# Patient Record
Sex: FEMALE | Race: WHITE | NOT HISPANIC OR LATINO | Employment: PART TIME | URBAN - METROPOLITAN AREA
[De-identification: names, ages, dates, MRNs, and addresses within clinical notes are randomized per-mention and may not be internally consistent; named-entity substitution may affect disease eponyms.]

---

## 2020-08-18 ENCOUNTER — OFFICE VISIT (OUTPATIENT)
Dept: FAMILY MEDICINE CLINIC | Facility: CLINIC | Age: 30
End: 2020-08-18

## 2020-08-18 VITALS
TEMPERATURE: 99.1 F | HEIGHT: 67 IN | DIASTOLIC BLOOD PRESSURE: 74 MMHG | HEART RATE: 93 BPM | SYSTOLIC BLOOD PRESSURE: 126 MMHG | OXYGEN SATURATION: 99 % | BODY MASS INDEX: 22.54 KG/M2 | WEIGHT: 143.6 LBS

## 2020-08-18 DIAGNOSIS — F19.10 DRUG ABUSE, IV (HCC): ICD-10-CM

## 2020-08-18 DIAGNOSIS — N64.3 BILATERAL GALACTORRHEA: ICD-10-CM

## 2020-08-18 DIAGNOSIS — R07.9 CHEST PAIN IN ADULT: Primary | ICD-10-CM

## 2020-08-18 DIAGNOSIS — N64.52 NIPPLE DISCHARGE IN FEMALE: ICD-10-CM

## 2020-08-18 PROCEDURE — 99204 OFFICE O/P NEW MOD 45 MIN: CPT | Performed by: FAMILY MEDICINE

## 2020-08-18 RX ORDER — MIRTAZAPINE 15 MG/1
15 TABLET, FILM COATED ORAL NIGHTLY
COMMUNITY
End: 2022-10-24

## 2020-08-18 NOTE — PROGRESS NOTES
Subjective   Samara Pearl is a 29 y.o. female.     Pt is here to [facility].   Pt under the care of [facility].  Took xanax about 3 weeks ago.    Chest pain over last couple months when lying down. If she sets up it mostly goes away.     History of Present Illness she had an appt with cardiology 2/2020 and 2/6 and cancelled and no showed 2/12/20. She reports some chest pain for last 2-3 months when she is laying flat and it goes away when she sits up.      She is seeing psych at [facility] for 1 month.   She is taking soboxone and has been off heroin and meth for 1 month.  She was in rehab in DEC in [facility] [place] at [facility].      She has history of ear tubes and tonsillectomy and rhinoplasty. She reports she is having some discharge like clostrum from breasts 3 weeks.  Last preg was 4 years ago.  She did breast feed for 9 weeks.      She also reports her father had MI at 48.      First day of LMP 7/12/20.  She has not taken a preg test.  She has been having the dc for 3 weeks.  She only taken remeron 1 time last night and it does coorelate with when she started suboxone.      She had covid test neg yesterday.       The following portions of the patient's history were reviewed and updated as appropriate: allergies, current medications, past family history, past medical history, past social history, past surgical history and problem list.    Review of Systems   Constitutional: Negative.    HENT: Negative.    Eyes: Negative.    Respiratory: Negative.    Cardiovascular: Negative.    Gastrointestinal: Negative.    Endocrine: Negative.    Genitourinary: Negative.    Musculoskeletal: Negative.    Skin: Negative.    Allergic/Immunologic: Negative.    Neurological: Negative.    Hematological: Negative.    Psychiatric/Behavioral: Negative.    All other systems reviewed and are negative.      Objective     Vitals:    08/18/20 1407   BP: 126/74   Pulse: 93   Temp:  "99.1 °F (37.3 °C)   SpO2: 99%   Weight: 65.1 kg (143 lb 9.6 oz)   Height: 170.2 cm (67\")       Physical Exam   Constitutional: She is oriented to person, place, and time. She appears well-developed and well-nourished.   HENT:   Head: Normocephalic and atraumatic.   Eyes: Pupils are equal, round, and reactive to light. EOM are normal. Right eye exhibits no discharge. Left eye exhibits no discharge.   Neck: Normal range of motion. Neck supple.   Cardiovascular: Normal rate, regular rhythm, normal heart sounds and intact distal pulses.   Pulmonary/Chest: Effort normal and breath sounds normal.   Abdominal: Soft. Bowel sounds are normal. She exhibits no mass. There is no tenderness.   Musculoskeletal: Normal range of motion.        Right shoulder: She exhibits no swelling.   Neurological: She is alert and oriented to person, place, and time. She has normal reflexes.   Skin: Skin is warm and dry. No cyanosis. Nails show no clubbing.   Psychiatric: She has a normal mood and affect. Her behavior is normal. Judgment and thought content normal.   Nursing note and vitals reviewed.      Assessment/Plan     Problem List Items Addressed This Visit        Nervous and Auditory    Chest pain in adult - Primary    Relevant Orders    Ambulatory Referral to Cardiology    CBC & Differential    TSH    Comprehensive Metabolic Panel    Hemoglobin A1c    Lipid Panel    Vitamin D 25 Hydroxy    Vitamin B12       Other    Drug abuse, IV (CMS/HCC)    Relevant Orders    Hepatitis Panel, Acute    RPR    HIV-1 / O / 2 Ag / Antibody 4th Generation    Nipple discharge in female    Relevant Orders    Prolactin    Bilateral galactorrhea        upt neg today  I suspect the nipple discharge is possibly from the Suboxone but I have asked her to call the Suboxone clinic to see if that is a side effect of the meantime she is going to return fasting tomorrow for a fasting cholesterol will check a prolactin level.  If his symptoms continue we will get a " mammogram possible breast ultrasound    It looks like she has already had a referral to see cardiology but she reports that her insurance had lapsed so she was unable to be seen so she is asking to be seen by cardiology.  Her reason and she occasionally has chest pain that feels like anxiety but also she has a close family history of coronary artery disease in her father at 49.  She just wants to establish with a cardiologist to make sure she is not currently having chest pain.

## 2020-08-31 ENCOUNTER — LAB (OUTPATIENT)
Dept: FAMILY MEDICINE CLINIC | Facility: CLINIC | Age: 30
End: 2020-08-31

## 2021-07-07 ENCOUNTER — TELEPHONE (OUTPATIENT)
Dept: FAMILY MEDICINE CLINIC | Facility: CLINIC | Age: 31
End: 2021-07-07

## 2021-07-07 NOTE — TELEPHONE ENCOUNTER
Attempted to contact patient via phone to discuss no-show policy.  No answer, letter mailed to home address on chart.

## 2021-11-09 ENCOUNTER — OFFICE VISIT (OUTPATIENT)
Dept: FAMILY MEDICINE CLINIC | Facility: CLINIC | Age: 31
End: 2021-11-09

## 2021-11-09 VITALS
WEIGHT: 154 LBS | SYSTOLIC BLOOD PRESSURE: 120 MMHG | RESPIRATION RATE: 18 BRPM | OXYGEN SATURATION: 99 % | HEIGHT: 67 IN | HEART RATE: 117 BPM | TEMPERATURE: 98.6 F | DIASTOLIC BLOOD PRESSURE: 80 MMHG | BODY MASS INDEX: 24.17 KG/M2

## 2021-11-09 DIAGNOSIS — R10.9 FLANK PAIN: ICD-10-CM

## 2021-11-09 DIAGNOSIS — N30.00 ACUTE CYSTITIS WITHOUT HEMATURIA: Primary | ICD-10-CM

## 2021-11-09 LAB
BILIRUB BLD-MCNC: NEGATIVE MG/DL
CLARITY, POC: ABNORMAL
COLOR UR: ABNORMAL
EXPIRATION DATE: ABNORMAL
GLUCOSE UR STRIP-MCNC: NEGATIVE MG/DL
KETONES UR QL: NEGATIVE
LEUKOCYTE EST, POC: ABNORMAL
Lab: ABNORMAL
NITRITE UR-MCNC: POSITIVE MG/ML
PH UR: 6 [PH] (ref 5–8)
PROT UR STRIP-MCNC: NEGATIVE MG/DL
RBC # UR STRIP: NEGATIVE /UL
SP GR UR: 1.02 (ref 1–1.03)
UROBILINOGEN UR QL: NORMAL

## 2021-11-09 PROCEDURE — 87077 CULTURE AEROBIC IDENTIFY: CPT | Performed by: NURSE PRACTITIONER

## 2021-11-09 PROCEDURE — 87186 SC STD MICRODIL/AGAR DIL: CPT | Performed by: NURSE PRACTITIONER

## 2021-11-09 PROCEDURE — 87086 URINE CULTURE/COLONY COUNT: CPT | Performed by: NURSE PRACTITIONER

## 2021-11-09 PROCEDURE — 99213 OFFICE O/P EST LOW 20 MIN: CPT | Performed by: NURSE PRACTITIONER

## 2021-11-09 RX ORDER — IBUPROFEN 800 MG/1
800 TABLET ORAL EVERY 8 HOURS PRN
Qty: 90 TABLET | Refills: 0 | Status: SHIPPED | OUTPATIENT
Start: 2021-11-09

## 2021-11-09 RX ORDER — OXCARBAZEPINE 300 MG/1
TABLET, FILM COATED ORAL
COMMUNITY
Start: 2021-11-04 | End: 2022-10-24

## 2021-11-09 RX ORDER — CIPROFLOXACIN 500 MG/1
500 TABLET, FILM COATED ORAL 2 TIMES DAILY
Qty: 14 TABLET | Refills: 0 | Status: SHIPPED | OUTPATIENT
Start: 2021-11-09 | End: 2021-11-16

## 2021-11-09 RX ORDER — LAMOTRIGINE 100 MG/1
TABLET ORAL
COMMUNITY
Start: 2021-11-02 | End: 2022-10-24

## 2021-11-09 RX ORDER — BUPRENORPHINE HYDROCHLORIDE AND NALOXONE HYDROCHLORIDE DIHYDRATE 8; 2 MG/1; MG/1
TABLET SUBLINGUAL
COMMUNITY
Start: 2021-11-02 | End: 2022-10-24

## 2021-11-12 ENCOUNTER — TELEPHONE (OUTPATIENT)
Dept: FAMILY MEDICINE CLINIC | Facility: CLINIC | Age: 31
End: 2021-11-12

## 2021-11-12 LAB — BACTERIA SPEC AEROBE CULT: ABNORMAL

## 2021-11-12 NOTE — TELEPHONE ENCOUNTER
Patient returned call and RN reiterated Dr Clark's message. Patient has more questions and wants Dr Clark to order blood work. RN recommend making an appt to discuss this and recent changes.   Appt made for 9/7/2018   Pt notified

## 2021-11-12 NOTE — TELEPHONE ENCOUNTER
----- Message from WOODROW Quijano sent at 11/12/2021  8:47 AM EST -----  Urine culture positive for E.Coli and current antibiotic is acceptable to treat this. If patient has any persistent symptoms please notify me and may repeat urinalysis in 2 weeks.

## 2021-11-15 NOTE — PROGRESS NOTES
"Chief Complaint  Flank Pain and Urinary Tract Infection    Subjective          Samara Pearl presents to [facility]  Flank Pain  This is a new problem. The current episode started in the past 7 days. The problem occurs daily. The problem has been gradually worsening since onset. The pain is present in the lumbar spine. The quality of the pain is described as aching. The pain does not radiate. The pain is at a severity of 6/10. The pain is moderate. The pain is the same all the time. The symptoms are aggravated by position and standing. Stiffness is present all day. Associated symptoms include dysuria and pelvic pain. Pertinent negatives include no abdominal pain, bladder incontinence, bowel incontinence, paresis or paresthesias. She has tried bed rest and heat for the symptoms. The treatment provided mild relief.   Urinary Tract Infection   Associated symptoms include flank pain.       Objective   Vital Signs:   /80   Pulse 117   Temp 98.6 °F (37 °C)   Resp 18   Ht 170.2 cm (67\")   Wt 69.9 kg (154 lb)   SpO2 99%   BMI 24.12 kg/m²     Physical Exam  Vitals and nursing note reviewed.   Constitutional:       General: She is not in acute distress.     Appearance: Normal appearance.   HENT:      Head: Normocephalic.      Right Ear: External ear normal.      Left Ear: External ear normal.      Nose: Nose normal.   Eyes:      Extraocular Movements: Extraocular movements intact.      Conjunctiva/sclera: Conjunctivae normal.      Pupils: Pupils are equal, round, and reactive to light.   Cardiovascular:      Rate and Rhythm: Normal rate and regular rhythm.      Heart sounds: Normal heart sounds.   Pulmonary:      Effort: Pulmonary effort is normal.      Breath sounds: Normal breath sounds.   Abdominal:      General: There is no distension.      Palpations: There is no shifting dullness, fluid wave or mass.      Tenderness: There is abdominal tenderness in the suprapubic area. " There is no guarding.   Musculoskeletal:      Lumbar back: No swelling, edema, signs of trauma or tenderness.      Right lower leg: No edema.      Left lower leg: No edema.   Skin:     General: Skin is warm and dry.   Neurological:      Mental Status: She is alert and oriented to person, place, and time.   Psychiatric:         Mood and Affect: Mood normal.         Behavior: Behavior normal.         Thought Content: Thought content normal.         Judgment: Judgment normal.        Result Review :     UA    Urinalysis 11/9/21   Ketones, UA Negative   Leukocytes, UA Trace (A)   (A) Abnormal value                     Assessment and Plan    Diagnoses and all orders for this visit:    1. Acute cystitis without hematuria (Primary)  Assessment & Plan:  Take meds as directed  Wipe from front to back  No bathing in tub  Drink plenty of water  Cranberry juice  RTO or call 2-3 days if no improvement  Urine culture sent      Orders:  -     ciprofloxacin (Cipro) 500 MG tablet; Take 1 tablet by mouth 2 (Two) Times a Day for 7 days.  Dispense: 14 tablet; Refill: 0  -     ibuprofen (ADVIL,MOTRIN) 800 MG tablet; Take 1 tablet by mouth Every 8 (Eight) Hours As Needed for Moderate Pain .  Dispense: 90 tablet; Refill: 0  -     Cancel: Urine Culture - Urine, Urine, Clean Catch  -     Urine Culture - Urine, Urine, Clean Catch; Future  -     Urine Culture - Urine, Urine, Clean Catch    2. Flank pain  Assessment & Plan:  Ibuprofen PRN as directed  Heat therapy PRN as directed  Rest    Orders:  -     POCT urinalysis dipstick, automated  -     ibuprofen (ADVIL,MOTRIN) 800 MG tablet; Take 1 tablet by mouth Every 8 (Eight) Hours As Needed for Moderate Pain .  Dispense: 90 tablet; Refill: 0  -     Urine Culture - Urine, Urine, Clean Catch; Future  -     Urine Culture - Urine, Urine, Clean Catch      Follow Up   Return if symptoms worsen or fail to improve.  Patient was given instructions and counseling regarding her condition or for health  maintenance advice. Please see specific information pulled into the AVS if appropriate.

## 2022-07-06 ENCOUNTER — TELEPHONE (OUTPATIENT)
Dept: FAMILY MEDICINE CLINIC | Facility: CLINIC | Age: 32
End: 2022-07-06

## 2022-07-06 NOTE — TELEPHONE ENCOUNTER
Caller: Samara Pearl    Relationship to patient: Self    Best call back number: [phone]    Date of positive COVID19 test: 07/06/22    COVID19 symptoms: SORE THROAT, HEADACHE, DIARRHEA, FATIGUE, BODY ACHES, AND FEVER    Additional information or concerns: PATIENT STATES THAT SHE WOULD LIKE A CALL ABOUT HER SYMPTOMS.    What is the patients preferred pharmacy: [facility]/pharmacy #[id number] - [place], [place] - [address] [phone] [facility] [phone]

## 2022-07-06 NOTE — TELEPHONE ENCOUNTER
Increase fluids Tylenol ibuprofen rest quarantine for 5 days after the onset of symptoms or until the fever is gone keep an eye on the pulse oximeter and if oxygen drops below 94 if she has shortness of breath go to the ER.

## 2022-10-24 ENCOUNTER — OFFICE VISIT (OUTPATIENT)
Dept: FAMILY MEDICINE CLINIC | Facility: CLINIC | Age: 32
End: 2022-10-24

## 2022-10-24 VITALS
OXYGEN SATURATION: 100 % | WEIGHT: 136.4 LBS | DIASTOLIC BLOOD PRESSURE: 72 MMHG | HEIGHT: 65 IN | HEART RATE: 113 BPM | TEMPERATURE: 98.4 F | BODY MASS INDEX: 22.73 KG/M2 | SYSTOLIC BLOOD PRESSURE: 130 MMHG

## 2022-10-24 DIAGNOSIS — N76.0 ACUTE VAGINITIS: ICD-10-CM

## 2022-10-24 DIAGNOSIS — R07.9 CHEST PAIN, UNSPECIFIED TYPE: Primary | ICD-10-CM

## 2022-10-24 PROCEDURE — 99214 OFFICE O/P EST MOD 30 MIN: CPT | Performed by: PHYSICIAN ASSISTANT

## 2022-10-24 RX ORDER — FLUCONAZOLE 150 MG/1
150 TABLET ORAL ONCE
Qty: 1 TABLET | Refills: 0 | Status: SHIPPED | OUTPATIENT
Start: 2022-10-24 | End: 2022-10-24

## 2022-10-24 RX ORDER — DOXYCYCLINE HYCLATE 100 MG/1
100 CAPSULE ORAL 2 TIMES DAILY
Qty: 20 CAPSULE | Refills: 0 | Status: SHIPPED | OUTPATIENT
Start: 2022-10-24 | End: 2022-11-03

## 2022-11-01 DIAGNOSIS — B96.89 BACTERIAL VAGINOSIS: Primary | ICD-10-CM

## 2022-11-01 DIAGNOSIS — N76.0 BACTERIAL VAGINOSIS: Primary | ICD-10-CM

## 2022-11-01 RX ORDER — METRONIDAZOLE 500 MG/1
500 TABLET ORAL 2 TIMES DAILY
Qty: 14 TABLET | Refills: 0 | Status: SHIPPED | OUTPATIENT
Start: 2022-11-01 | End: 2022-11-08

## 2022-11-06 PROCEDURE — 93000 ELECTROCARDIOGRAM COMPLETE: CPT | Performed by: PHYSICIAN ASSISTANT

## 2022-11-07 DIAGNOSIS — R07.9 CHEST PAIN, UNSPECIFIED TYPE: Primary | ICD-10-CM

## 2022-11-30 ENCOUNTER — OFFICE VISIT (OUTPATIENT)
Dept: CARDIOLOGY | Facility: CLINIC | Age: 32
End: 2022-11-30

## 2022-11-30 VITALS
RESPIRATION RATE: 18 BRPM | BODY MASS INDEX: 23.66 KG/M2 | SYSTOLIC BLOOD PRESSURE: 128 MMHG | DIASTOLIC BLOOD PRESSURE: 86 MMHG | HEART RATE: 120 BPM | OXYGEN SATURATION: 98 % | HEIGHT: 65 IN | WEIGHT: 142 LBS

## 2022-11-30 DIAGNOSIS — R00.2 PALPITATIONS: Primary | ICD-10-CM

## 2022-11-30 PROCEDURE — 93000 ELECTROCARDIOGRAM COMPLETE: CPT | Performed by: PHYSICIAN ASSISTANT

## 2022-11-30 PROCEDURE — 99243 OFF/OP CNSLTJ NEW/EST LOW 30: CPT | Performed by: PHYSICIAN ASSISTANT

## 2022-11-30 RX ORDER — MIRTAZAPINE 15 MG/1
30 TABLET, FILM COATED ORAL
COMMUNITY
Start: 2022-11-07

## 2022-11-30 RX ORDER — BUPRENORPHINE HYDROCHLORIDE AND NALOXONE HYDROCHLORIDE DIHYDRATE 8; 2 MG/1; MG/1
TABLET SUBLINGUAL
COMMUNITY
Start: 2022-11-28

## 2022-11-30 RX ORDER — OXCARBAZEPINE 300 MG/1
300 TABLET, FILM COATED ORAL 2 TIMES DAILY
COMMUNITY
Start: 2022-11-14

## 2022-11-30 NOTE — PROGRESS NOTES
"[facility] at [facility]  INITIAL OFFICE CONSULT      Samara Pearl  1990  PCP: Kirsten Soni MD    SUBJECTIVE:   Samara Pearl is a 31 y.o. female seen for a consultation visit regarding the following:     Chief Complaint:   Chief Complaint   Patient presents with   • Chest Pain     X2 years          Consultation is requested by SATURNINO Pittman for evaluation of Chest Pain (X2 years)        History:  Pleasant 31-year-old female presents today at the request of primary care provider regarding cardiac evaluation for chest pain, and elevated heart rate.  Patient states she has never had any health issues exception that she did have substance abuse issues with heroin IV drug use.  She is been sober now for 2 years remains on Suboxone.  She has had chest pain for about 2 years.  She was told 1 of initially for started this was probably \"pleurisy\".  She describes a sharp stabbing knifelike pain central region of chest usually worse at nighttime.  She states when she goes to bed at night she starts to having sharp pain and when she takes of breath it feels worse.  When she stands up and change position sometimes gets better.  However when she exerts herself such as doing work around her house like painting or physical exertion her chest pain is actually not reproduced or changed in nature.  She denies any worsening palpitation episodes she denies any heart failure symptoms she denies any dizziness near syncope or syncope.  She is due to get routine lab work today which she has not had in some time.  She does note her heart was elevated but she attributes this to being anxious as well she did drink a Monster drink prior to her visit today.  She states she has quite a bit of caffeine during the day as well she is continue to smoke over half a pack a service a day.      Cardiac PMH: (Old records have been reviewed and summarized below)  1. Noncardiac chest " pain  2. Palpitations  3. Tobacco abuse on going abuse  4. IV drug use, resolved (no use 2 years) Suboxone Rx.   5. Caffeine use  6. Anxiety        Past Medical History, Past Surgical History, Family history, Social History, and Medications were all reviewed with the patient today and updated as necessary.     Current Outpatient Medications   Medication Sig Dispense Refill   • buprenorphine-naloxone (SUBOXONE) 8-2 MG per SL tablet PLACE 1 & 3/4 (ONE & THREE-FOURTHS) TABLETS UNDER THE TONGUE ONCE DAILY     • ibuprofen (ADVIL,MOTRIN) 800 MG tablet Take 1 tablet by mouth Every 8 (Eight) Hours As Needed for Moderate Pain . 90 tablet 0   • mirtazapine (REMERON) 15 MG tablet Take 30 mg by mouth every night at bedtime.     • OXcarbazepine (TRILEPTAL) 300 MG tablet Take 300 mg by mouth 2 (Two) Times a Day.       No current facility-administered medications for this visit.     Allergies   Allergen Reactions   • Penicillins Hives and Other (See Comments)     THROAT SWELLS   • Sulfa Antibiotics Anaphylaxis   • Latex          Past Medical History:   Diagnosis Date   • History of blood transfusion 1992   • Trichomonas infection 2015   • Urinary tract infection      Past Surgical History:   Procedure Laterality Date   • EAR TUBES     • RHINOPLASTY     • TONSILLECTOMY     • TUBAL ABDOMINAL LIGATION       Family History   Problem Relation Age of Onset   • Diabetes Mother    • Heart disease Mother    • Cancer Mother         ovarian     Social History     Tobacco Use   • Smoking status: Every Day     Packs/day: 0.50     Years: 10.00     Pack years: 5.00     Types: Cigarettes   • Smokeless tobacco: Never   Substance Use Topics   • Alcohol use: Not Currently       ROS:  Review of Symptoms:  General: no recent weight loss/gain, weakness or fatigue  Skin: no rashes, lumps, or other skin changes  HEENT: no dizziness, lightheadedness, or vision changes  Respiratory: no cough or hemoptysis  Cardiovascular: + palpitations, and  "tachycardia  Gastrointestinal: no black/tarry stools or diarrhea  Urinary: no change in frequency or urgency  Peripheral Vascular: no claudication or leg cramps  Musculoskeletal: no muscle or joint pain/stiffness  Psychiatric: no depression or excessive stress  Neurological: no sensory or motor loss, no syncope  Hematologic: no anemia, easy bruising or bleeding  Endocrine: no thyroid problems, nor heat or cold intolerance         PHYSICAL EXAM:   /86   Pulse 120   Resp 18   Ht 163.8 cm (64.5\")   Wt 64.4 kg (142 lb)   SpO2 98%   BMI 24.00 kg/m²      Wt Readings from Last 5 Encounters:   11/30/22 64.4 kg (142 lb)   10/24/22 61.9 kg (136 lb 6.4 oz)   11/09/21 69.9 kg (154 lb)   08/18/20 65.1 kg (143 lb 9.6 oz)     BP Readings from Last 5 Encounters:   11/30/22 128/86   10/24/22 130/72   11/09/21 120/80   08/18/20 126/74       General-Well Nourished, Well developed  Eyes - PERRLA  Neck- supple, No mass  CV-rapid rate and rhythm, no MRG  Lung- clear bilaterally  Abd- soft, +BS  Musc/skel - Norm strength and range of motion  Skin- warm and dry  Neuro - Alert & Oriented x 3, appropriate mood.    Patient's external notes were reviewed.  Independent interpretation of test performed by another physician in facility were reviewed.  Outside laboratory data was also reviewed.    Medical problems and test results were reviewed with the patient today.     Results for orders placed or performed in visit on 11/09/21   Urine Culture - Urine, Urine, Clean Catch    Specimen: Urine, Clean Catch   Result Value Ref Range    Urine Culture >100,000 CFU/mL Escherichia coli (A)        Susceptibility    Escherichia coli - JUAN     Amikacin  Susceptible ug/ml     Ampicillin  Resistant ug/ml     Ampicillin + Sulbactam  Intermediate ug/ml     Cefazolin  Susceptible ug/ml     Cefepime  Susceptible ug/ml     Ceftazidime  Susceptible ug/ml     Ceftriaxone  Susceptible ug/ml     Gentamicin  Resistant ug/ml     Levofloxacin  Susceptible " ug/ml     Nitrofurantoin  Susceptible ug/ml     Piperacillin + Tazobactam  Susceptible ug/ml     Tetracycline  Resistant ug/ml     Tobramycin  Intermediate ug/ml     Trimethoprim + Sulfamethoxazole  Resistant ug/ml   POCT urinalysis dipstick, automated    Specimen: Urine   Result Value Ref Range    Color Dark Yellow Yellow, Straw, Dark Yellow, Danyell    Clarity, UA Cloudy (A) Clear    Specific Gravity  1.020 (A) 1.005 - 1.030    pH, Urine 6.0 5.0 - 8.0    Leukocytes Trace (A) Negative    Nitrite, UA Positive (A) Negative    Protein, POC Negative Negative mg/dL    Glucose, UA Negative Negative, 1000 mg/dL (3+) mg/dL    Ketones, UA Negative Negative    Urobilinogen, UA Normal Normal    Bilirubin Negative Negative    Blood, UA Negative Negative    Lot Number [id number]     Expiration Date 5/19/2023          No results found for: CHOL, HDL, HDLC, LDL, LDLC, VLDL    EKG:  (EKG/Tracing has been independently visualized by me and summarized below)      ECG 12 Lead    Date/Time: 11/30/2022 4:04 PM  Performed by: Bunny Shirley PA  Authorized by: Bunny Shirley PA   Rhythm: sinus tachycardia  Rate: tachycardic  Conduction: conduction normal  ST Segments: ST segments normal  QRS axis: normal  Other: no other findings    Clinical impression: normal ECG            ASSESSMENT   1.  Noncardiac chest pain, chest wall pain.  2.  Palpitations, elevated heart rate.  3.  Ongoing tobacco use  4.  Excessive caffeine use, monster drinks coffee soda  5.  Prior IV drug abuse: Sober for 2 years current Suboxone therapy      PLAN  · Echocardiogram  · GXT stress test  · Routine labs today to rule out secondary causes for sinus tachycardia  · Discontinue tobacco discussed need for immediate cessation.  Reduce caffeine intake.  Increase hydration fluids Gatorade Powerade.  · Return follow-up her office 2 months or sooner as needed          Cardiology/Electrophysiology  11/30/22  16:01 EST  Will Fern WILSON

## 2024-05-11 ENCOUNTER — APPOINTMENT (OUTPATIENT)
Facility: HOSPITAL | Age: 34
End: 2024-05-11
Payer: MEDICAID

## 2024-05-11 ENCOUNTER — HOSPITAL ENCOUNTER (EMERGENCY)
Facility: HOSPITAL | Age: 34
Discharge: HOME | End: 2024-05-11
Attending: EMERGENCY MEDICINE
Payer: MEDICAID

## 2024-05-11 VITALS
WEIGHT: 137 LBS | HEIGHT: 66 IN | DIASTOLIC BLOOD PRESSURE: 64 MMHG | TEMPERATURE: 98.7 F | HEART RATE: 52 BPM | OXYGEN SATURATION: 100 % | BODY MASS INDEX: 22.02 KG/M2 | RESPIRATION RATE: 14 BRPM | SYSTOLIC BLOOD PRESSURE: 112 MMHG

## 2024-05-11 DIAGNOSIS — R07.89 ATYPICAL CHEST PAIN: Primary | ICD-10-CM

## 2024-05-11 DIAGNOSIS — R07.9 CHEST PAIN IN ADULT: ICD-10-CM

## 2024-05-11 LAB
ALBUMIN SERPL-MCNC: 4.3 G/DL (ref 3.5–5.2)
ALBUMIN/GLOB SERPL: 1.7 G/DL
ALP SERPL-CCNC: 79 U/L (ref 39–117)
ALT SERPL W P-5'-P-CCNC: 36 U/L (ref 1–33)
AMPHET+METHAMPHET UR QL: NEGATIVE
AMPHETAMINES UR QL: NEGATIVE
ANION GAP SERPL CALCULATED.3IONS-SCNC: 12.7 MMOL/L (ref 5–15)
AST SERPL-CCNC: 55 U/L (ref 1–32)
B-HCG UR QL: NEGATIVE
BARBITURATES UR QL SCN: NEGATIVE
BASOPHILS # BLD AUTO: 0.03 10*3/MM3 (ref 0–0.2)
BASOPHILS NFR BLD AUTO: 0.4 % (ref 0–1.5)
BENZODIAZ UR QL SCN: NEGATIVE
BILIRUB SERPL-MCNC: <0.2 MG/DL (ref 0–1.2)
BUN SERPL-MCNC: 14 MG/DL (ref 6–20)
BUN/CREAT SERPL: 23.3 (ref 7–25)
BUPRENORPHINE SERPL-MCNC: NEGATIVE NG/ML
CALCIUM SPEC-SCNC: 9.1 MG/DL (ref 8.6–10.5)
CANNABINOIDS SERPL QL: NEGATIVE
CHLORIDE SERPL-SCNC: 103 MMOL/L (ref 98–107)
CO2 SERPL-SCNC: 22.3 MMOL/L (ref 22–29)
COCAINE UR QL: NEGATIVE
CREAT SERPL-MCNC: 0.6 MG/DL (ref 0.57–1)
DEPRECATED RDW RBC AUTO: 53.6 FL (ref 37–54)
EGFRCR SERPLBLD CKD-EPI 2021: 123.2 ML/MIN/1.73
EOSINOPHIL # BLD AUTO: 0.18 10*3/MM3 (ref 0–0.4)
EOSINOPHIL NFR BLD AUTO: 2.3 % (ref 0.3–6.2)
ERYTHROCYTE [DISTWIDTH] IN BLOOD BY AUTOMATED COUNT: 19.7 % (ref 12.3–15.4)
FENTANYL UR-MCNC: NEGATIVE NG/ML
GLOBULIN UR ELPH-MCNC: 2.6 GM/DL
GLUCOSE SERPL-MCNC: 95 MG/DL (ref 65–99)
HCT VFR BLD AUTO: 38.1 % (ref 34–46.6)
HGB BLD-MCNC: 11.6 G/DL (ref 12–15.9)
HOLD SPECIMEN: NORMAL
IMM GRANULOCYTES # BLD AUTO: 0.02 10*3/MM3 (ref 0–0.05)
IMM GRANULOCYTES NFR BLD AUTO: 0.3 % (ref 0–0.5)
LIPASE SERPL-CCNC: 16 U/L (ref 13–60)
LYMPHOCYTES # BLD AUTO: 2.12 10*3/MM3 (ref 0.7–3.1)
LYMPHOCYTES NFR BLD AUTO: 27.1 % (ref 19.6–45.3)
MCH RBC QN AUTO: 23.3 PG (ref 26.6–33)
MCHC RBC AUTO-ENTMCNC: 30.4 G/DL (ref 31.5–35.7)
MCV RBC AUTO: 76.5 FL (ref 79–97)
METHADONE UR QL SCN: POSITIVE
MONOCYTES # BLD AUTO: 0.73 10*3/MM3 (ref 0.1–0.9)
MONOCYTES NFR BLD AUTO: 9.3 % (ref 5–12)
NEUTROPHILS NFR BLD AUTO: 4.73 10*3/MM3 (ref 1.7–7)
NEUTROPHILS NFR BLD AUTO: 60.6 % (ref 42.7–76)
NT-PROBNP SERPL-MCNC: 60.8 PG/ML (ref 0–450)
OPIATES UR QL: NEGATIVE
OXYCODONE UR QL SCN: NEGATIVE
PCP UR QL SCN: NEGATIVE
PLATELET # BLD AUTO: 207 10*3/MM3 (ref 140–450)
PMV BLD AUTO: 11.6 FL (ref 6–12)
POTASSIUM SERPL-SCNC: 4.3 MMOL/L (ref 3.5–5.2)
PROT SERPL-MCNC: 6.9 G/DL (ref 6–8.5)
RBC # BLD AUTO: 4.98 10*6/MM3 (ref 3.77–5.28)
SODIUM SERPL-SCNC: 138 MMOL/L (ref 136–145)
TRICYCLICS UR QL SCN: NEGATIVE
TROPONIN T SERPL HS-MCNC: <6 NG/L
WBC NRBC COR # BLD AUTO: 7.81 10*3/MM3 (ref 3.4–10.8)
WHOLE BLOOD HOLD COAG: NORMAL
WHOLE BLOOD HOLD SPECIMEN: NORMAL

## 2024-05-11 PROCEDURE — 81025 URINE PREGNANCY TEST: CPT | Performed by: PHYSICIAN ASSISTANT

## 2024-05-11 PROCEDURE — 83880 ASSAY OF NATRIURETIC PEPTIDE: CPT | Performed by: EMERGENCY MEDICINE

## 2024-05-11 PROCEDURE — 80307 DRUG TEST PRSMV CHEM ANLYZR: CPT | Performed by: PHYSICIAN ASSISTANT

## 2024-05-11 PROCEDURE — 93005 ELECTROCARDIOGRAM TRACING: CPT | Performed by: EMERGENCY MEDICINE

## 2024-05-11 PROCEDURE — 84484 ASSAY OF TROPONIN QUANT: CPT | Performed by: EMERGENCY MEDICINE

## 2024-05-11 PROCEDURE — 71275 CT ANGIOGRAPHY CHEST: CPT

## 2024-05-11 PROCEDURE — 99285 EMERGENCY DEPT VISIT HI MDM: CPT

## 2024-05-11 PROCEDURE — 36415 COLL VENOUS BLD VENIPUNCTURE: CPT

## 2024-05-11 PROCEDURE — 25510000001 IOPAMIDOL PER 1 ML: Performed by: EMERGENCY MEDICINE

## 2024-05-11 PROCEDURE — 71045 X-RAY EXAM CHEST 1 VIEW: CPT

## 2024-05-11 PROCEDURE — 85025 COMPLETE CBC W/AUTO DIFF WBC: CPT | Performed by: EMERGENCY MEDICINE

## 2024-05-11 PROCEDURE — 83690 ASSAY OF LIPASE: CPT | Performed by: EMERGENCY MEDICINE

## 2024-05-11 PROCEDURE — 80053 COMPREHEN METABOLIC PANEL: CPT | Performed by: EMERGENCY MEDICINE

## 2024-05-11 RX ORDER — ASPIRIN 81 MG/1
324 TABLET, CHEWABLE ORAL ONCE
Status: COMPLETED | OUTPATIENT
Start: 2024-05-11 | End: 2024-05-11

## 2024-05-11 RX ORDER — SODIUM CHLORIDE 0.9 % (FLUSH) 0.9 %
10 SYRINGE (ML) INJECTION AS NEEDED
Status: DISCONTINUED | OUTPATIENT
Start: 2024-05-11 | End: 2024-05-11 | Stop reason: HOSPADM

## 2024-05-11 RX ORDER — DICLOFENAC SODIUM 75 MG/1
75 TABLET, DELAYED RELEASE ORAL 2 TIMES DAILY
Qty: 20 TABLET | Refills: 0 | Status: SHIPPED | OUTPATIENT
Start: 2024-05-11

## 2024-05-11 RX ADMIN — IOPAMIDOL 79 ML: 755 INJECTION, SOLUTION INTRAVENOUS at 12:17

## 2024-05-11 RX ADMIN — ASPIRIN 81 MG CHEWABLE TABLET 324 MG: 81 TABLET CHEWABLE at 11:27

## 2024-05-11 NOTE — [facility] PROVIDER NOTE
"Subjective  History of Present Illness:    Patient is a 33-year-old female presenting to the emergency department with complaints of chest pain.  Patient states symptoms have been intermittent over the past week.  She reports pain starts in the left side of her chest.  She states it is worsened with inspiration.  He denies shortness of breath, nausea, vomiting.  Patient states she has had similar pain with pleurisy in the past.  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  Patient has no prior cardiac history.      Nurses Notes reviewed and agree, including vitals, allergies, social history and prior medical history.     REVIEW OF SYSTEMS: All systems reviewed and not pertinent unless noted.  Review of Systems   Cardiovascular:  Positive for chest pain.       Past Medical History:   Diagnosis Date    History of blood transfusion 1992    Trichomonas infection 2015    Urinary tract infection        Allergies:    Penicillins, Sulfa antibiotics, and Latex      Past Surgical History:   Procedure Laterality Date    EAR TUBES      RHINOPLASTY      TONSILLECTOMY      TUBAL ABDOMINAL LIGATION           Social History     Socioeconomic History    Marital status: Significant Other   Tobacco Use    Smoking status: Every Day     Current packs/day: 0.50     Average packs/day: 0.5 packs/day for 10.0 years (5.0 ttl pk-yrs)     Types: Cigarettes    Smokeless tobacco: Never   Substance and Sexual Activity    Alcohol use: Not Currently    Drug use: No    Sexual activity: Never         Family History   Problem Relation Age of Onset    Diabetes Mother     Heart disease Mother     Cancer Mother         ovarian       Objective  Physical Exam:  /64   Pulse 52   Temp 98.7 °F (37.1 °C)   Resp 14   Ht 167.6 cm (66\")   Wt 62.1 kg (137 lb)   LMP  (Approximate)   SpO2 100%   BMI 22.11 kg/m²      Physical Exam  Constitutional:       Appearance: She is well-developed and normal weight.   HENT:      Head: " Normocephalic and atraumatic.   Eyes:      Pupils: Pupils are equal, round, and reactive to light.   Cardiovascular:      Rate and Rhythm: Normal rate and regular rhythm.      Heart sounds: Normal heart sounds.   Pulmonary:      Effort: Pulmonary effort is normal.      Breath sounds: Normal breath sounds.   Musculoskeletal:         General: Normal range of motion.      Cervical back: Normal range of motion and neck supple.   Skin:     General: Skin is warm and dry.   Neurological:      General: No focal deficit present.      Mental Status: She is alert and oriented to person, place, and time.   Psychiatric:         Mood and Affect: Mood normal.         Behavior: Behavior normal.         ECG 12 Lead ED Triage Standing Order; Chest Pain      Date/Time: 5/11/2024 12:41 PM    Performed by: Victor M Hammond MD  Authorized by: Victor M Hammond MD  Interpreted by ED physician  Rhythm: sinus rhythm  Rate: normal  BPM: 86  QRS axis: normal  ST Segments: ST segments normal  Other findings: prolonged QTc interval  Clinical impression: abnormal ECG    ECG 12 Lead      Date/Time: 5/11/2024 1:53 PM    Performed by: Victor M Hammond MD  Authorized by: Victor M Hammond MD  Interpreted by ED physician  Comparison: compared with previous ECG from 5/11/2024  Similar to previous ECG  Rhythm: sinus bradycardia  Rate: bradycardic  BPM: 58  QRS axis: normal  Conduction: conduction normal  ST Segments: ST segments normal  T Waves: T waves normal  Other: no other findings  Clinical impression: normal ECG        ED Course:     Patient was evaluated and labs were obtained, they were nonconcerning at this time.  High-sensitivity troponin is within normal limits.  CT imaging of the chest is negative for acute findings.  Initial EKG did reveal a prolonged QTc at 490.  This was repeated and QTc is 459.  Patient is on methadone, which can be a contributing factor.  I did advise the patient to follow-up with cardiology and consider tapering methadone.   Given the ambulatory cardiology clinic to follow-up.  She understands and agrees with this plan of care.  She is well-appearing with stable vitals at time of discharge.    Lab Results (last 24 hours)       Procedure Component Value Units Date/Time    High Sensitivity Troponin T [[id number]]  (Normal) Collected: 05/11/24 1115    Specimen: Blood Updated: 05/11/24 1138     HS Troponin T <6 ng/L     CBC & Differential [[id number]]  (Abnormal) Collected: 05/11/24 1115    Specimen: Blood Updated: 05/11/24 1122    Narrative:      The following orders were created for panel order CBC & Differential.  Procedure                               Abnormality         Status                     ---------                               -----------         ------                     CBC Auto Differential[[id number]]        Abnormal            Final result                 Please view results for these tests on the individual orders.    Comprehensive Metabolic Panel [[id number]]  (Abnormal) Collected: 05/11/24 1115    Specimen: Blood Updated: 05/11/24 1227     Glucose 95 mg/dL      BUN 14 mg/dL      Creatinine 0.60 mg/dL      Sodium 138 mmol/L      Potassium 4.3 mmol/L      Chloride 103 mmol/L      CO2 22.3 mmol/L      Calcium 9.1 mg/dL      Total Protein 6.9 g/dL      Albumin 4.3 g/dL      ALT (SGPT) 36 U/L      AST (SGOT) 55 U/L      Alkaline Phosphatase 79 U/L      Total Bilirubin <0.2 mg/dL      Globulin 2.6 gm/dL      A/G Ratio 1.7 g/dL      BUN/Creatinine Ratio 23.3     Anion Gap 12.7 mmol/L      eGFR 123.2 mL/min/1.73     Narrative:      GFR Normal >60  Chronic Kidney Disease <60  Kidney Failure <15      Lipase [[id number]]  (Normal) Collected: 05/11/24 1115    Specimen: Blood Updated: 05/11/24 1227     Lipase 16 U/L     BNP [[id number]]  (Normal) Collected: 05/11/24 1115    Specimen: Blood Updated: 05/11/24 1138     proBNP 60.8 pg/mL     Narrative:      This assay is used as an aid in the diagnosis of individuals suspected of having  heart failure. It can be used as an aid in the diagnosis of acute decompensated heart failure (ADHF) in patients presenting with signs and symptoms of ADHF to the emergency department (ED). In addition, NT-proBNP of <300 pg/mL indicates ADHF is not likely.    Age Range Result Interpretation  NT-proBNP Concentration (pg/mL:      <50             Positive            >450                   Gray                 300-450                    Negative             <300    50-75           Positive            >900                  Gray                300-900                  Negative            <300      >75             Positive            >1800                  Gray                300-1800                  Negative            <300    CBC Auto Differential [[id number]]  (Abnormal) Collected: 05/11/24 1115    Specimen: Blood Updated: 05/11/24 1122     WBC 7.81 10*3/mm3      RBC 4.98 10*6/mm3      Hemoglobin 11.6 g/dL      Hematocrit 38.1 %      MCV 76.5 fL      MCH 23.3 pg      MCHC 30.4 g/dL      RDW 19.7 %      RDW-SD 53.6 fl      MPV 11.6 fL      Platelets 207 10*3/mm3      Neutrophil % 60.6 %      Lymphocyte % 27.1 %      Monocyte % 9.3 %      Eosinophil % 2.3 %      Basophil % 0.4 %      Immature Grans % 0.3 %      Neutrophils, Absolute 4.73 10*3/mm3      Lymphocytes, Absolute 2.12 10*3/mm3      Monocytes, Absolute 0.73 10*3/mm3      Eosinophils, Absolute 0.18 10*3/mm3      Basophils, Absolute 0.03 10*3/mm3      Immature Grans, Absolute 0.02 10*3/mm3     Pregnancy, Urine - Urine, Clean Catch [[id number]]  (Normal) Collected: 05/11/24 1141    Specimen: Urine, Clean Catch Updated: 05/11/24 1152     HCG, Urine QL Negative    Urine Drug Screen - Urine, Clean Catch [[id number]]  (Abnormal) Collected: 05/11/24 1141    Specimen: Urine, Clean Catch Updated: 05/11/24 1157     THC, Screen, Urine Negative     Phencyclidine (PCP), Urine Negative     Cocaine Screen, Urine Negative     Methamphetamine, Ur Negative     Opiate Screen Negative      Amphetamine Screen, Urine Negative     Benzodiazepine Screen, Urine Negative     Tricyclic Antidepressants Screen Negative     Methadone Screen, Urine Positive     Barbiturates Screen, Urine Negative     Oxycodone Screen, Urine Negative     Buprenorphine, Screen, Urine Negative    Narrative:      Cutoff For Drugs Screened:    Amphetamines               500 ng/ml  Barbiturates               200 ng/ml  Benzodiazepines            150 ng/ml  Cocaine                    150 ng/ml  Methadone                  200 ng/ml  Opiates                    100 ng/ml  Phencyclidine               25 ng/ml  THC                         50 ng/ml  Methamphetamine            500 ng/ml  Tricyclic Antidepressants  300 ng/ml  Oxycodone                  100 ng/ml  Buprenorphine               10 ng/ml    The normal value for all drugs tested is negative. This report includes unconfirmed screening results, with the cutoff values listed, to be used for medical treatment purposes only.  Unconfirmed results must not be used for non-medical purposes such as employment or legal testing.  Clinical consideration should be applied to any drug of abuse test, particularly when unconfirmed results are used.      Fentanyl, Urine - Urine, Clean Catch [[id number]]  (Normal) Collected: 05/11/24 1141    Specimen: Urine, Clean Catch Updated: 05/11/24 1201     Fentanyl, Urine Negative    Narrative:      Negative Threshold:      Fentanyl 5 ng/mL     The normal value for the drug tested is negative. This report includes final unconfirmed screening results to be used for medical treatment purposes only. Unconfirmed results must not be used for non-medical purposes such as employment or legal testing. Clinical consideration should be applied to any drug of abuse test, particularly when unconfirmed results are used.                    CT Angiogram Chest Pulmonary Embolism    Result Date: 5/11/2024  CT ANGIOGRAM CHEST PULMONARY EMBOLISM Date of Exam: 5/11/2024 12:12  PM EDT Indication: chest pain. Comparison: Chest x-ray 5/11/2024 Technique: Axial CT images were obtained of the chest after the uneventful intravenous administration of 80 mL Isovue-370 utilizing pulmonary embolism protocol.  Reconstructed coronal and sagittal images were also obtained. Automated exposure control and  iterative construction methods were used. Findings: PULMONARY VASCULATURE: Pulmonary arteries are widely patent without evidence of embolus.Main pulmonary artery is normal in size. No evidence of right heart strain. MEDIASTINUM:Unremarkable. Aortic and heart size are normal. No aortic dissection identified. No mass nor pericardial effusion. CORONARY ARTERIES: Nocalcified atherosclerotic disease. LUNGS: Lungs are clear. No consolidation. No significant nodule nor interstitial changes. PLEURAL SPACE: No effusion, mass, nor pneumothorax. LYMPH NODES: There are no pathologically enlarged lymph nodes. UPPER ABDOMEN: Unremarkable OSSEOUS STRUCTURES: Appropriate for age with no acute process identified.     Impression: Impression: 1. No evidence for pulmonary embolus. 2. No acute cardiopulmonary process. Electronically Signed: Gianna Jeter MD  5/11/2024 12:37 PM EDT  Workstation ID: [id number]    XR Chest 1 View    Result Date: 5/11/2024  XR CHEST 1 VW Date of Exam: 5/11/2024 11:56 AM EDT Indication: Chest Pain Triage Protocol Comparison: None available. Findings: Lungs are clear without focal consolidation, overt edema, large effusion or pneumothorax. Heart size within normal limits. Slightly blunted right costophrenic angle, potentially related to pleural fluid. Osseous structures are grossly unremarkable.      Impression: Slight blunting of right costophrenic angle, question trace pleural fluid. Otherwise no active pulmonary process. Electronically Signed: Eusebio Ramos MD  5/11/2024 12:16 PM EDT  Workstation ID: [id number]        MDM     Amount and/or Complexity of Data Reviewed  Clinical lab tests:  reviewed  Tests in the radiology section of CPT®: reviewed  Tests in the medicine section of CPT®: reviewed          DDX: includes but is not limited to: Pleurisy, pneumonia, angina, pulmonary embolism    Patient arrives EMS with vitals interpreted by myself.     Pertinent features from physical exam: No murmur auscultated.    Interventions / Re-evaluation: Patient's symptoms have improved    Medications   aspirin chewable tablet 324 mg (324 mg Oral Given 5/11/24 1127)   iopamidol (ISOVUE-370) 76 % injection 100 mL (79 mL Intravenous Given 5/11/24 1217)         -----  ED Disposition       ED Disposition   Discharge    Condition   Stable    Comment   --             Final diagnoses:   Atypical chest pain   Chest pain in adult      Your Follow-Up Providers       [facility] CARDIOLOGY.    Specialty: Cardiology  [address] [address] [phone]  Additional information:  Phone Number: [phone]. Located on the same side of the street as [employer] and [address].  Entrance Information: Patients are encouraged to park in front of [facility], using Entrance A, near the fountain.                      Contact information for after-discharge care    Follow-up information has not been specified.                    Your medication list        START taking these medications        Instructions Last Dose Given Next Dose Due   diclofenac 75 MG EC tablet  Commonly known as: VOLTAREN      Take 1 tablet by mouth 2 (Two) Times a Day.              CONTINUE taking these medications        Instructions Last Dose Given Next Dose Due   buprenorphine-naloxone 8-2 MG per SL tablet  Commonly known as: SUBOXONE      PLACE 1 & 3/4 (ONE & THREE-FOURTHS) TABLETS UNDER THE TONGUE ONCE DAILY       ibuprofen 800 MG tablet  Commonly known as: ADVIL,MOTRIN      Take 1 tablet by mouth Every 8 (Eight) Hours As Needed for Moderate Pain .       mirtazapine 15 MG tablet  Commonly known  as: REMERON      Take 2 tablets by mouth every night at bedtime.       OXcarbazepine 300 MG tablet  Commonly known as: TRILEPTAL      Take 1 tablet by mouth 2 (Two) Times a Day.                 Where to Get Your Medications        These medications were sent to [facility] [address] [place] [address] [address] [facility] [phone]  - [phone] [place] [address]      Phone: [phone]   diclofenac 75 MG EC tablet

## 2024-05-18 LAB
QT INTERVAL: 410 MS
QT INTERVAL: 468 MS
QTC INTERVAL: 459 MS
QTC INTERVAL: 490 MS